# Patient Record
Sex: MALE | URBAN - METROPOLITAN AREA
[De-identification: names, ages, dates, MRNs, and addresses within clinical notes are randomized per-mention and may not be internally consistent; named-entity substitution may affect disease eponyms.]

---

## 2019-12-05 ENCOUNTER — OFFICE VISIT (OUTPATIENT)
Dept: URBAN - METROPOLITAN AREA CLINIC 76 | Facility: CLINIC | Age: 59
End: 2019-12-05
Payer: MEDICARE

## 2019-12-05 DIAGNOSIS — H25.13 AGE-RELATED NUCLEAR CATARACT, BILATERAL: ICD-10-CM

## 2019-12-05 DIAGNOSIS — H52.223 REGULAR ASTIGMATISM, BILATERAL: ICD-10-CM

## 2019-12-05 DIAGNOSIS — E11.3293 TYPE 2 DIAB W MILD NONPRLF DIABETIC RTNOP W/O MACULAR EDEMA, BILATERAL: Primary | ICD-10-CM

## 2019-12-05 PROCEDURE — 92004 COMPRE OPH EXAM NEW PT 1/>: CPT | Performed by: OPTOMETRIST

## 2019-12-05 ASSESSMENT — INTRAOCULAR PRESSURE
OS: 17
OD: 16

## 2019-12-05 ASSESSMENT — VISUAL ACUITY
OS: 20/20
OD: 20/25

## 2019-12-05 ASSESSMENT — KERATOMETRY
OS: 43.75
OD: 43.00

## 2019-12-05 NOTE — IMPRESSION/PLAN
Impression: Type 2 diab w mild nonprlf diabetic rtnop w/o macular edema, bilateral: N70.8909. OU. Plan: Discussed diagnosis in detail with patient. No treatment is required at this time. Emphasized blood sugar control. Call if [address] worsens. Will continue to observe condition and or symptoms. Recommend yearly dilated exams. Letter sent to PCP.

## 2021-12-07 ENCOUNTER — OFFICE VISIT (OUTPATIENT)
Dept: URBAN - METROPOLITAN AREA CLINIC 81 | Facility: CLINIC | Age: 61
End: 2021-12-07
Payer: MEDICARE

## 2021-12-07 DIAGNOSIS — H53.031 STRABISMIC AMBLYOPIA, RIGHT EYE: ICD-10-CM

## 2021-12-07 DIAGNOSIS — H25.813 COMBINED FORMS OF AGE-RELATED CATARACT, BILATERAL: Primary | ICD-10-CM

## 2021-12-07 DIAGNOSIS — E11.9 TYPE 2 DIABETES MELLITUS W/O COMPLICATION: ICD-10-CM

## 2021-12-07 PROCEDURE — 92014 COMPRE OPH EXAM EST PT 1/>: CPT | Performed by: OPTOMETRIST

## 2021-12-07 ASSESSMENT — VISUAL ACUITY
OD: 20/30
OS: 20/30

## 2021-12-07 ASSESSMENT — KERATOMETRY
OS: 43.75
OD: 42.63

## 2021-12-07 ASSESSMENT — INTRAOCULAR PRESSURE
OD: 16
OS: 16

## 2021-12-07 NOTE — IMPRESSION/PLAN
Impression: Type 2 diabetes mellitus w/o complication: H97.8. Plan: Diabetes type II: no background diabetic retinopathy, no signs of neovascularization noted. Discussed ocular and systemic benefits of blood sugar control. Continue to monitor blood sugar and continue care with PCP. Advised patient to RTC immediately if changes to vision are noted. Continue to monitor for changes.

## 2021-12-07 NOTE — IMPRESSION/PLAN
Impression: Strabismic amblyopia, right eye: H53.031. Plan: Discussed diagnosis with patient in detail. No treatment is required at this time. Will continue to observe condition and/or symptoms. Patient instructed to call if condition gets worse.